# Patient Record
Sex: MALE | Race: BLACK OR AFRICAN AMERICAN | URBAN - METROPOLITAN AREA
[De-identification: names, ages, dates, MRNs, and addresses within clinical notes are randomized per-mention and may not be internally consistent; named-entity substitution may affect disease eponyms.]

---

## 2023-07-13 ENCOUNTER — OFFICE VISIT (OUTPATIENT)
Facility: LOCATION | Age: 68
End: 2023-07-13
Payer: OTHER GOVERNMENT

## 2023-07-13 DIAGNOSIS — H25.13 AGE-RELATED NUCLEAR CATARACT, BILATERAL: ICD-10-CM

## 2023-07-13 DIAGNOSIS — H40.1133 PRIMARY OPEN-ANGLE GLAUCOMA, SEVERE STAGE, BILATERAL: Primary | ICD-10-CM

## 2023-07-13 PROCEDURE — 76514 ECHO EXAM OF EYE THICKNESS: CPT | Performed by: OPHTHALMOLOGY

## 2023-07-13 PROCEDURE — 92020 GONIOSCOPY: CPT | Performed by: OPHTHALMOLOGY

## 2023-07-13 PROCEDURE — 99204 OFFICE O/P NEW MOD 45 MIN: CPT | Performed by: OPHTHALMOLOGY

## 2023-07-13 PROCEDURE — 92133 CPTRZD OPH DX IMG PST SGM ON: CPT | Performed by: OPHTHALMOLOGY

## 2023-07-13 ASSESSMENT — INTRAOCULAR PRESSURE
OS: 19
OD: 26
OS: 17
OD: 23

## 2023-07-13 NOTE — IMPRESSION/PLAN
Impression: Patient presents for glaucoma evaluation. Referred [facility]. Poor medication compliance. POHx: POAG severe OU, (+) childhood head trauma from fall, (-) lasers, surgeries. FOHx: Unknown to glaucoma. PMHx: COPD, HTN. Eye medications: Blue cap QD, Simbrinza TID Tmax: IOP in 20's Target IOP: Not established. Plan: Testing:
Fundus photos: pending. OCT/ONH 7/2023: OU severe NFL loss 360. Baseline. HVF 24-2: pending. Pachy 7/2023: 549/549. Gonio 7/2023: OS , OD SS 2+ 360 Today:
IOP unacceptable OD. Baseline OCT ONH, Pachymetry, and Gonioscopy performed and reviewed. Informed patient exam and testing today revealed severe glaucoma damage. Discussed pathophysiology of glaucoma with patient at length. Discussed the nature of optic nerve damage which can lead to irreversible peripheral and central vision loss. Educated on evidence-based treatment options with glaucoma medications, in-office laser procedures and surgeries to lower the intraocular pressure with goal to slow or stop the disease progression. Discussed in length that sometimes the disease is so aggressive that despite all the treatment patients still lose their vision. Informed about importance of regular follow ups to able to identify the signs of glaucoma progression and to escalate the treatment. Patient expressed understanding. Discussed next steps for treatment is SLT and the importance of medical compliance with glaucoma gtts. Emphasized SLT laser may help with IOP to prevent glaucoma progression but glaucoma gtts will still be needed. The risks, benefits, and alternatives were explained, including (but not limited to) the risks of corneal abrasion, inflammation, pain, worsening vision, IOP spike, and possible need for further procedures. Patient expressed understanding and would like to proceed with SLT at this time. Briefly discussed glaucoma surgery may be needed in the future. Plan:
Continue glaucoma gtts Advised patient will bring in glaucoma gtts next visit. Schedule for SLT OD then OS next available.

## 2023-07-13 NOTE — IMPRESSION/PLAN
Impression: Examination revealed cataracts. 1+NS OU. Plan: Will revisit once patient is visually bothered by cataracts or IOPs become unacceptable.

## 2023-07-28 ENCOUNTER — PROCEDURE (OUTPATIENT)
Facility: LOCATION | Age: 68
End: 2023-07-28
Payer: OTHER GOVERNMENT

## 2023-07-28 DIAGNOSIS — H40.1133 PRIMARY OPEN-ANGLE GLAUCOMA, SEVERE STAGE, BILATERAL: Primary | ICD-10-CM

## 2023-07-28 PROCEDURE — 65855 TRABECULOPLASTY LASER SURG: CPT | Performed by: OPHTHALMOLOGY

## 2023-07-28 RX ORDER — PREDNISOLONE ACETATE 10 MG/ML
1 % SUSPENSION/ DROPS OPHTHALMIC
Qty: 5 | Refills: 0 | Status: ACTIVE
Start: 2023-07-28

## 2023-07-28 ASSESSMENT — INTRAOCULAR PRESSURE
OD: 16
OS: 10